# Patient Record
Sex: FEMALE | Race: BLACK OR AFRICAN AMERICAN | Employment: FULL TIME
[De-identification: names, ages, dates, MRNs, and addresses within clinical notes are randomized per-mention and may not be internally consistent; named-entity substitution may affect disease eponyms.]

---

## 2023-12-27 ENCOUNTER — HOSPITAL ENCOUNTER (EMERGENCY)
Facility: HOSPITAL | Age: 18
Discharge: HOME | End: 2023-12-27
Payer: COMMERCIAL

## 2023-12-27 VITALS
WEIGHT: 170 LBS | OXYGEN SATURATION: 100 % | TEMPERATURE: 99.1 F | SYSTOLIC BLOOD PRESSURE: 121 MMHG | DIASTOLIC BLOOD PRESSURE: 78 MMHG | BODY MASS INDEX: 30.12 KG/M2 | HEIGHT: 63 IN | HEART RATE: 85 BPM | RESPIRATION RATE: 18 BRPM

## 2023-12-27 DIAGNOSIS — J06.9 ACUTE UPPER RESPIRATORY INFECTION: Primary | ICD-10-CM

## 2023-12-27 LAB
FLUAV AG NPH QL IA: NEGATIVE
FLUBV AG NOSE QL IA: NEGATIVE

## 2023-12-27 PROCEDURE — 99283 EMERGENCY DEPT VISIT LOW MDM: CPT

## 2023-12-27 PROCEDURE — 87804 INFLUENZA ASSAY W/OPTIC: CPT

## 2023-12-27 PROCEDURE — 87635 SARS-COV-2 COVID-19 AMP PRB: CPT

## 2023-12-27 RX ORDER — ONDANSETRON 4 MG/1
4 TABLET, FILM COATED ORAL DAILY PRN
Qty: 15 TABLET | Refills: 0 | Status: SHIPPED | OUTPATIENT
Start: 2023-12-27

## 2023-12-27 NOTE — ED PROVIDER NOTES
[facility] EMERGENCY DEPT  EMERGENCY DEPARTMENT HISTORY AND PHYSICAL EXAM      Date: 12/27/2023  Patient Name: Dipika Boggs  MRN: [id number]  [facility] [facility] 2005  Date of evaluation: 12/27/2023  Provider: Rey Alves PA-C   Note Started: 1:01 PM EST 12/27/23    HISTORY OF PRESENT ILLNESS     Chief Complaint   Patient presents with    Headache    Nasal Congestion       History Provided By: Patient    HPI: Dipika Boggs is a 19 y.o. female with no pertinent past medical history who presents to the emergency department with fever, body aches, productive cough, headaches, and congestion for 2 days. Patient states younger brother has similar symptoms. Patient has been managing symptoms at home with ibuprofen as needed. Last dose at 10 AM this morning. Patient also endorses nausea with 1 episode of nonbloody emesis and diarrhea yesterday. Patient denies weakness, dizziness, ear pain, sore throat, abdominal pain, chest pain, or shortness of breath. Patient is tolerating oral intake at this time. PAST MEDICAL HISTORY   Past Medical History:  History reviewed. No pertinent past medical history. Past Surgical History:  History reviewed. No pertinent surgical history. Family History:  History reviewed. No pertinent family history. Social History:  Social History     Tobacco Use    Smoking status: Never    Smokeless tobacco: Never   Substance Use Topics    Drug use: Yes     Types: Marijuana (Weed)       Allergies:  No Known Allergies    PCP: None, None    Current Meds:   No current facility-administered medications for this encounter.      Current Outpatient Medications   Medication Sig Dispense Refill    ondansetron (ZOFRAN) 4 MG tablet Take 1 tablet by mouth daily as needed for Nausea or Vomiting 15 tablet 0       Social Determinants of Health:   Social Determinants of Health     Tobacco Use: Low Risk  (12/27/2023)    Patient History     Smoking Tobacco Use: Never     Smokeless Tobacco Use: Never

## 2023-12-28 LAB
SARS-COV-2 RNA RESP QL NAA+PROBE: DETECTED
SOURCE: ABNORMAL